# Patient Record
Sex: FEMALE | Race: BLACK OR AFRICAN AMERICAN
[De-identification: names, ages, dates, MRNs, and addresses within clinical notes are randomized per-mention and may not be internally consistent; named-entity substitution may affect disease eponyms.]

---

## 2018-05-04 ENCOUNTER — HOSPITAL ENCOUNTER (EMERGENCY)
Dept: HOSPITAL 53 - M ED | Age: 28
Discharge: HOME | End: 2018-05-04
Payer: COMMERCIAL

## 2018-05-04 DIAGNOSIS — S93.402A: Primary | ICD-10-CM
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
## 2018-05-04 PROCEDURE — 73610 X-RAY EXAM OF ANKLE: CPT

## 2018-05-04 RX ADMIN — IBUPROFEN 1 MG: 600 TABLET, FILM COATED ORAL at 16:23

## 2021-04-08 ENCOUNTER — HOSPITAL ENCOUNTER (OUTPATIENT)
Dept: HOSPITAL 53 - M RADPRO | Age: 31
End: 2021-04-08
Attending: OBSTETRICS & GYNECOLOGY
Payer: COMMERCIAL

## 2021-04-08 DIAGNOSIS — N97.9: Primary | ICD-10-CM

## 2021-04-08 PROCEDURE — 74740 X-RAY FEMALE GENITAL TRACT: CPT

## 2021-04-08 PROCEDURE — 58340 CATHETER FOR HYSTEROGRAPHY: CPT

## 2021-04-08 NOTE — REP
INDICATION:

INFERTILITY---PLEASE CHECK ORDER.



COMPARISON:

None.



TECHNIQUE:

The endometrium was cannulated and contrast was injected by the attending gynecologist

Dr. La.



Fluoroscopic spot films were acquired by Korin Wetzel [facility], under the direct

supervision of Dr. Cormier.  Images reviewed prior with Dr. Cormier to dictation.



FINDINGS:

Fluoroscopy spot radiographs document filling of a normal endometrial cavity.  There

is normal isthmic and ampullary fallopian tube opacification, and bilateral tubal

patency was documented.





IMPRESSION:

Normal hysterosalpingogram with bilateral tubal patency documented.



0.2 minutes of fluoroscopy time was utilized for this procedure. Some fluoroscopic

images are performed with last image hold technology.  These images require no

additional radiation.



<Electronically signed by Korin Wetzel > 04/08/21 1533

<Electronically signed by Presley Cormier > 04/08/21 1704